# Patient Record
Sex: FEMALE | Race: WHITE | NOT HISPANIC OR LATINO | Employment: UNEMPLOYED | URBAN - METROPOLITAN AREA
[De-identification: names, ages, dates, MRNs, and addresses within clinical notes are randomized per-mention and may not be internally consistent; named-entity substitution may affect disease eponyms.]

---

## 2017-01-11 ENCOUNTER — OFFICE VISIT (OUTPATIENT)
Dept: OBSTETRICS AND GYNECOLOGY | Facility: CLINIC | Age: 27
End: 2017-01-11

## 2017-01-11 VITALS
DIASTOLIC BLOOD PRESSURE: 70 MMHG | WEIGHT: 168 LBS | SYSTOLIC BLOOD PRESSURE: 112 MMHG | BODY MASS INDEX: 27 KG/M2 | HEIGHT: 66 IN

## 2017-01-11 DIAGNOSIS — L73.9 FOLLICULITIS: ICD-10-CM

## 2017-01-11 DIAGNOSIS — R53.83 FATIGUE, UNSPECIFIED TYPE: ICD-10-CM

## 2017-01-11 DIAGNOSIS — N76.0 ACUTE VAGINITIS: Primary | ICD-10-CM

## 2017-01-11 PROCEDURE — 87210 SMEAR WET MOUNT SALINE/INK: CPT | Performed by: OBSTETRICS & GYNECOLOGY

## 2017-01-11 PROCEDURE — 99213 OFFICE O/P EST LOW 20 MIN: CPT | Performed by: OBSTETRICS & GYNECOLOGY

## 2017-01-11 PROCEDURE — 83986 ASSAY PH BODY FLUID NOS: CPT | Performed by: OBSTETRICS & GYNECOLOGY

## 2017-01-11 RX ORDER — METRONIDAZOLE 500 MG/1
500 TABLET ORAL 2 TIMES DAILY
Qty: 14 TABLET | Refills: 0 | Status: SHIPPED | OUTPATIENT
Start: 2017-01-11 | End: 2017-01-18

## 2017-01-11 NOTE — PROGRESS NOTES
"Subjective   Chief Complaint   Patient presents with   • Abdominal Pain     lower abd pain / vag irritation /    • Exposure to STD   • Nausea     Elodia Dunbar is a 27 y.o. year old  presenting to be seen because of vaginal irriation.   Current birth control method: Nexplanon contraceptive implant and condoms occasionally. One with condom another partner she has order so sick that she has herpes because some lesions on her mons.  She has been doing some online research.  She has become a little nauseated worrying about this.  She wants to have her HIV test [place] screened even nausea protected sex.  This was negative in 2016.  I suggested that if her a repeat this in May that that wouldn't really be cost effective to do today.  She is agreeable.  We will do the GC chlamydia today.  She is having some irritation as well so will probably do a wet mount    Past 6 month menstrual and contraceptive history:    No LMP recorded. Patient has had an implant.  Cycle Frequency: irregular   Menstrual cycle character: flow is light   Cycle Duration: 3 - 10   Number of heavy days of flows: 0   Intermenstrual bleeding present: no   Post-coital bleeding present: no     Some major pains - cramping and tightening    The following portions of the patient's history were reviewed and updated as appropriate:problem list, current medications and allergies    Review of Systems normal bladder and bowels nausea as above     Objective   Visit Vitals   • /70   • Ht 66\" (167.6 cm)   • Wt 168 lb (76.2 kg)   • LMP Comment: nexplanon   • BMI 27.12 kg/m2       General:  well developed; well nourished  no acute distress   Skin:  No suspicious lesions seen   Thyroid: not examined   Lungs:  breathing is unlabored   Heart:  Not performed.   Breasts:  Not performed.   Abdomen: soft, non-tender; no masses  no umbilical or inginual hernias are present  no hepato-splenomegaly   Pelvis: Clinical staff was present for exam  External " genitalia:  normal appearance of the external genitalia including Bartholin's and [place]'s glands. There are 2 lesions on her mons 1 approximately 1/2 cm on the left medially one more laterally that's about half a centimeter in size.  These are centered around hair follicles with the minimal amount of pus at the base of the hair follicle.  There is no blistering or anything consistent with a vesicular lesion  :  urethral meatus normal; urethral hypermobility is absent.  Vaginal:  normal pink mucosa without prolapse or lesions. discharge present -  Off-white somewhat thin slightly foamy pH is elevated at 6 microscopically a few clue cells are seen no trichomonads or saying;  Cervix:  normal appearance. GC chlamydia cultures obtained  Uterus:  normal size, shape and consistency.  Adnexa:  normal bimanual exam of the adnexa.  Rectal:  anus visually normal appearing.     Lab Review   GC chlamydia Pap test HIV RPR    Imaging   No data reviewed       Assessment   1. I think this represents folliculitis of the mons  2. Bacterial vaginosis  3. I do not think this represents herpes.  4. Nexplanon in place     Plan   1. Reassurance  2. HIV and RPR in May for completeness.  3. Continue use of condoms.    Medications Rx this encounter:  New Medications Ordered This Visit   Medications   • metroNIDAZOLE (FLAGYL) 500 MG tablet     Sig: Take 1 tablet by mouth 2 (Two) Times a Day for 7 days.     Dispense:  14 tablet     Refill:  0          This note was electronically signed.    Salvador Oshea MD  January 11, 2017

## 2017-01-11 NOTE — MR AVS SNAPSHOT
Elodia Dunbar   1/11/2017 11:00 AM   Office Visit    Dept Phone:  [phone]   Encounter #:  [id number]    Provider:  Salvador Oshea MD   Department:  [facility] [place]                Your Full Care Plan              Today's Medication Changes          These changes are accurate as of: 1/11/17 11:41 AM.  If you have any questions, ask your nurse or doctor.               New Medication(s)Ordered:     metroNIDAZOLE 500 MG tablet   Commonly known as:  FLAGYL   Take 1 tablet by mouth 2 (Two) Times a Day for 7 days.   Started by:  Salvador Oshea MD            Where to Get Your Medications      These medications were sent to [address] [facility] [phone] [facility] [phone]   [address], [facility] [address]     Phone:  [phone]     metroNIDAZOLE 500 MG tablet                  Your Updated Medication List          This list is accurate as of: 1/11/17 11:41 AM.  Always use your most recent med list.                AFLURIA PRESERVATIVE FREE 0.5 ML suspension prefilled syringe   Generic drug:  influenza virus vacc split PF       metroNIDAZOLE 500 MG tablet   Commonly known as:  FLAGYL   Take 1 tablet by mouth 2 (Two) Times a Day for 7 days.       NEXPLANON 68 MG implant subdermal implant   Generic drug:  Etonogestrel               You Were Diagnosed With        Codes Comments    Acute vaginitis    -  Primary ICD-10-CM: N76.0  ICD-9-CM: 616.10     Fatigue, unspecified type     ICD-10-CM: R53.83  ICD-9-CM: 780.79     Folliculitis     ICD-10-CM: L73.9  ICD-9-CM: 704.8       Medications to be Given to You by a Medical Professional     Due       Frequency    (none) ibuprofen (ADVIL,MOTRIN) tablet 600 mg  Every 6 Hours PRN      Instructions     None    Patient Instructions History      Upcoming Appointments     Visit Type Date Time Department    GYN FOLLOW UP 1/11/2017 11:00 AM [facility]  "[facility]      [employer] Signup     [facility] [employer] allows you to send messages to your doctor, view your test results, renew your prescriptions, schedule appointments, and more. To sign up, go to [employer] and click on the Sign Up Now link in the New User? box. Enter your [employer] Activation Code exactly as it appears below along with the last four digits of your Social Security Number and your Date of Birth () to complete the sign-up process. If you do not sign up before the expiration date, you must request a new code.    [employer] Activation Code: [email or web address]  Expires: 2017 11:41 AM    If you have questions, you can email [email or web address] or call [phone] to talk to our [employer] staff. Remember, [employer] is NOT to be used for urgent needs. For medical emergencies, dial 911.               Other Info from Your Visit           Allergies     No Known Allergies      Reason for Visit     Abdominal Pain lower abd pain / vag irritation /     Exposure to STD     Nausea           Vital Signs     Blood Pressure Height Weight Body Mass Index Smoking Status       112/70 66\" (167.6 cm) 168 lb (76.2 kg) 27.12 kg/m2 Never Smoker       Problems and Diagnoses Noted     Inflammation of vagina    -  Primary    Tiredness        Folliculitis            "

## 2017-01-23 ENCOUNTER — TELEPHONE (OUTPATIENT)
Dept: OBSTETRICS AND GYNECOLOGY | Facility: CLINIC | Age: 27
End: 2017-01-23

## 2017-01-23 NOTE — TELEPHONE ENCOUNTER
----- Message from Maame Platt sent at 1/23/2017  2:16 PM EST -----  Please call pt at [phone]      Dr. Oshea pt  1/23/2017 @ 3:44pm [phone] patient states folliculitis of the mons is getting worse. She has been applying heat but it is not helping, she has noticed a couple more spots.

## 2017-01-27 ENCOUNTER — OFFICE VISIT (OUTPATIENT)
Dept: OBSTETRICS AND GYNECOLOGY | Facility: CLINIC | Age: 27
End: 2017-01-27

## 2017-01-27 VITALS
BODY MASS INDEX: 28.12 KG/M2 | HEIGHT: 66 IN | DIASTOLIC BLOOD PRESSURE: 80 MMHG | SYSTOLIC BLOOD PRESSURE: 116 MMHG | WEIGHT: 175 LBS

## 2017-01-27 DIAGNOSIS — B37.31 MONILIAL VULVITIS: ICD-10-CM

## 2017-01-27 DIAGNOSIS — N76.0 ACUTE VAGINITIS: Primary | ICD-10-CM

## 2017-01-27 PROCEDURE — 87210 SMEAR WET MOUNT SALINE/INK: CPT | Performed by: OBSTETRICS & GYNECOLOGY

## 2017-01-27 PROCEDURE — 83986 ASSAY PH BODY FLUID NOS: CPT | Performed by: OBSTETRICS & GYNECOLOGY

## 2017-01-27 PROCEDURE — 99212 OFFICE O/P EST SF 10 MIN: CPT | Performed by: OBSTETRICS & GYNECOLOGY

## 2017-01-27 RX ORDER — FLUCONAZOLE 200 MG/1
200 TABLET ORAL DAILY
Qty: 3 TABLET | Refills: 1 | Status: SHIPPED | OUTPATIENT
Start: 2017-01-27 | End: 2017-08-14

## 2017-01-27 RX ORDER — METRONIDAZOLE 500 MG/1
500 TABLET ORAL 2 TIMES DAILY
Qty: 14 TABLET | Refills: 0 | Status: SHIPPED | OUTPATIENT
Start: 2017-01-27 | End: 2017-02-03

## 2017-01-27 NOTE — MR AVS SNAPSHOT
Elodia Dunbar   1/27/2017 2:30 PM   Office Visit    Dept Phone:  [phone]   Encounter #:  [id number]    Provider:  Salvador Oshea MD   Department:  [facility] [place]                Your Full Care Plan              Today's Medication Changes          These changes are accurate as of: 1/27/17  3:29 PM.  If you have any questions, ask your nurse or doctor.               New Medication(s)Ordered:     fluconazole 200 MG tablet   Commonly known as:  DIFLUCAN   Take 1 tablet by mouth Daily. Take 1 pill every other day #3   Started by:  Salvador Oshea MD       metroNIDAZOLE 500 MG tablet   Commonly known as:  FLAGYL   Take 1 tablet by mouth 2 (Two) Times a Day for 7 days.   Started by:  Salvador Oshea MD            Where to Get Your Medications      These medications were sent to [place] [address] [address] [facility] [phone] [facility] [phone] FX  [address], [place] [address]     Phone:  [phone]     fluconazole 200 MG tablet    metroNIDAZOLE 500 MG tablet                  Your Updated Medication List          This list is accurate as of: 1/27/17  3:29 PM.  Always use your most recent med list.                AFLURIA PRESERVATIVE FREE 0.5 ML suspension prefilled syringe   Generic drug:  influenza virus vacc split PF       fluconazole 200 MG tablet   Commonly known as:  DIFLUCAN   Take 1 tablet by mouth Daily. Take 1 pill every other day #3       metroNIDAZOLE 500 MG tablet   Commonly known as:  FLAGYL   Take 1 tablet by mouth 2 (Two) Times a Day for 7 days.       NEXPLANON 68 MG implant subdermal implant   Generic drug:  Etonogestrel               You Were Diagnosed With        Codes Comments    Acute vaginitis    -  Primary ICD-10-CM: N76.0  ICD-9-CM: 616.10     Monilial vulvitis     ICD-10-CM: B37.3  ICD-9-CM: 112.1       Medications to be Given to You by a Medical Professional       Instructions     "None    Patient Instructions History      Upcoming Appointments     Visit Type Date Time Department    GYN FOLLOW UP 2017  2:30 PM [facility]    GYN FOLLOW UP 2017 10:00 AM [facility]      MyChart Signup     [facility] [employer] allows you to send messages to your doctor, view your test results, renew your prescriptions, schedule appointments, and more. To sign up, go to [employer] and click on the Sign Up Now link in the New User? box. Enter your [employer] Activation Code exactly as it appears below along with the last four digits of your Social Security Number and your Date of Birth () to complete the sign-up process. If you do not sign up before the expiration date, you must request a new code.    [employer] Activation Code: [email or web address]  Expires: 2/10/2017  3:29 PM    If you have questions, you can email [email or web address] or call [phone] to talk to our [employer] staff. Remember, [employer] is NOT to be used for urgent needs. For medical emergencies, dial 911.               Other Info from Your Visit           Your Appointments     2017 10:00 AM EDT   GYN FOLLOW UP with Salvador Oshea MD   [facility] [place] (--)    [address] [place] [address] [address] [place] [id number] [phone]              Allergies     No Known Allergies      Reason for Visit     Follow-up hair follicle no better      Vital Signs     Blood Pressure Height Weight Body Mass Index Smoking Status       116/80 66\" (167.6 cm) 175 lb (79.4 kg) 28.25 kg/m2 Never Smoker       Problems and Diagnoses Noted     Inflammation of vagina    -  Primary    Yeast infection of genitourinary system            "

## 2017-01-27 NOTE — PROGRESS NOTES
"Subjective   Chief Complaint   Patient presents with   • Follow-up     hair follicle no better     Elodia Dunbar is a 27 y.o. year old  presenting to be seen for evaluation of an abnormal vaginal discharge. He is concerned that this may be herpes as she had an outbreak on the mons and cleared up she had intercourse in had a recurrence.  She also has a vaginal discharge she thinks bacterial vaginosis.  She is sexually active.  In the past 12 months there has been new sexual partners.  Condoms are not typically used.  She would like to be screened for herpes however there are no vesicular lesions to screen or test 4.originally this looked like folliculitis and was treated as such but it is gotten worse.      Current biPast 6 month menstrual and contraceptive history:    No LMP recorded. Patient has had an implant.                              The following portions of the patient's history were reviewed and updated as appropriate:current medications, allergies and past surgical history     Review of Systems normal bladder and bowels no pain during intercourse     Objective   Visit Vitals   • /80   • Ht 66\" (167.6 cm)   • Wt 175 lb (79.4 kg)   • BMI 28.25 kg/m2       General:  well developed; well nourished  no acute distress   Skin:  No suspicious lesions seen  Rash noted on on the left area of the mons there are numerous red scaling lesions with dry skin no vesicular lesions whatsoever.  There is central clearing.  There is one area that's a little firmer where the original lesion was a few weeks ago.   Abdomen: soft, non-tender; no masses  no umbilical or inginual hernias are present  no hepato-splenomegaly   Pelvis: Clinical staff was present for exam  External genitalia:  normal appearance of the external genitalia including Bartholin's and [place]'s glands. see above description  :  urethral meatus normal; urethral hypermobility is absent.  Vaginal:  normal pink mucosa without prolapse or lesions. " discharge present -  yellow, white, thick and the pH is elevated and there are clue cells seen microscopically;  Cervix:  normal appearance.  Rectal:  anus visually normal appearing.     Physical Exam    Lab Review   No data reviewed    Imaging   No data reviewed       Assessment   1. This appears to be a tenia/yeast on the mons area  2. Bacterial vaginosis     Plan   1. Diflucan 200 mg 1 by mouth every other day numbers 6 with1 refill- she could use over-the-counter Tinactin-type spray as well  2. Flagyl 500 mg twice a day #14    Medications Rx this encounter:  No orders of the defined types were placed in this encounter.         This note was electronically signed.    Salvador Oshea MD  January 27, 2017

## 2017-02-15 ENCOUNTER — TELEPHONE (OUTPATIENT)
Dept: OBSTETRICS AND GYNECOLOGY | Facility: CLINIC | Age: 27
End: 2017-02-15

## 2017-02-15 PROCEDURE — G0432 EIA HIV-1/HIV-2 SCREEN: HCPCS | Performed by: NURSE PRACTITIONER

## 2017-02-15 PROCEDURE — 86780 TREPONEMA PALLIDUM: CPT | Performed by: NURSE PRACTITIONER

## 2017-02-15 NOTE — TELEPHONE ENCOUNTER
Pt had tried benedryl. Still has rash. Had been off antibiotic for several days. Because of full schedule was to go to [facility]

## 2017-02-15 NOTE — TELEPHONE ENCOUNTER
----- Message from Cindy Le sent at 2/15/2017  3:36 PM EST -----  Contact: [phone]  Pt pofc adv has rash( not pregnant) - was on antibiotics previously   Rash is on stomach - thighs and behind her knees and was wondering if this was a reaction to the antibiotics

## 2017-02-20 ENCOUNTER — TELEPHONE (OUTPATIENT)
Dept: URGENT CARE | Facility: CLINIC | Age: 27
End: 2017-02-20

## 2017-08-14 ENCOUNTER — OFFICE VISIT (OUTPATIENT)
Dept: OBSTETRICS AND GYNECOLOGY | Facility: CLINIC | Age: 27
End: 2017-08-14

## 2017-08-14 ENCOUNTER — APPOINTMENT (OUTPATIENT)
Dept: LAB | Facility: HOSPITAL | Age: 27
End: 2017-08-14

## 2017-08-14 VITALS
WEIGHT: 183 LBS | BODY MASS INDEX: 30.49 KG/M2 | HEIGHT: 65 IN | DIASTOLIC BLOOD PRESSURE: 70 MMHG | SYSTOLIC BLOOD PRESSURE: 114 MMHG

## 2017-08-14 DIAGNOSIS — Z97.5 NEXPLANON IN PLACE: ICD-10-CM

## 2017-08-14 DIAGNOSIS — Z71.1 CONCERN ABOUT STD IN FEMALE WITHOUT DIAGNOSIS: ICD-10-CM

## 2017-08-14 DIAGNOSIS — Z01.419 WOMEN'S ANNUAL ROUTINE GYNECOLOGICAL EXAMINATION: Primary | ICD-10-CM

## 2017-08-14 DIAGNOSIS — N92.6 IRREGULAR MENSES: ICD-10-CM

## 2017-08-14 DIAGNOSIS — Z30.46 ENCOUNTER FOR SURVEILLANCE OF IMPLANTABLE SUBDERMAL CONTRACEPTIVE: ICD-10-CM

## 2017-08-14 PROBLEM — B00.9 HSV-1 INFECTION: Status: ACTIVE | Noted: 2017-08-14

## 2017-08-14 LAB — HIV1+2 AB SER QL: NORMAL

## 2017-08-14 PROCEDURE — 99395 PREV VISIT EST AGE 18-39: CPT | Performed by: OBSTETRICS & GYNECOLOGY

## 2017-08-14 PROCEDURE — 86592 SYPHILIS TEST NON-TREP QUAL: CPT | Performed by: OBSTETRICS & GYNECOLOGY

## 2017-08-14 PROCEDURE — 36415 COLL VENOUS BLD VENIPUNCTURE: CPT | Performed by: OBSTETRICS & GYNECOLOGY

## 2017-08-14 PROCEDURE — G0432 EIA HIV-1/HIV-2 SCREEN: HCPCS | Performed by: OBSTETRICS & GYNECOLOGY

## 2017-08-14 RX ORDER — LINDANE 10 MG/ML
SHAMPOO, SUSPENSION TOPICAL ONCE
Qty: 60 ML | Refills: 0 | Status: SHIPPED | OUTPATIENT
Start: 2017-08-14 | End: 2017-08-14

## 2017-08-14 NOTE — PROGRESS NOTES
"Subjective   Chief Complaint   Patient presents with   • Annual Exam     Elodia Dunbar is a 27 y.o. year old  presenting to be seen for her annual exam.    Current birth control method: Nexplanon contraceptive implant.    No LMP recorded. Patient has had an implant.    She is sexually active.   Condoms are not typically used.      Past 6 month menstrual history:    Cycle Frequency: irregular   Menstrual cycle character: flow is typically light and flow is typically normal   Cycle Duration: 2 - 3   Number of heavy days of flows: 0   Intermenstrual bleeding present: {yes   Post-coital bleeding present: no     She exercises regularly: no.  Calcium intake: yes.  She performs self breast exam:no.  She has concerns about domestic violence: no.    The following portions of the patient's history were reviewed and updated as appropriate:problem list, current medications, allergies, past family history, past medical history, past social history and past surgical history.    Review of Systems    normal bladder and bowels  Objective   /70  Ht 64.75\" (164.5 cm)  Wt 183 lb (83 kg)  LMP Comment: nexplanon  BMI 30.69 kg/m2     General:  well developed; well nourished  no acute distress  appears stated age   Skin:  Her lower abdomen has small pink bumps.  The lower extremities have some pink slightly raised plaques and red areas particularly around her ankles that are healing scars.   Thyroid: normal to inspection and palpation   Breasts:  Examined in supine position  Symmetric without masses or skin dimpling  Nipples normal without inversion, lesions or discharge  There are no palpable axillary nodes   Abdomen: soft, non-tender; no masses  no umbilical or inginual hernias are present  no hepato-splenomegaly   Pelvis: Clinical staff was present for exam  External genitalia:  normal appearance of the external genitalia including Bartholin's and [place]'s glands.  :  urethral meatus normal; urethral hypermobility is " absent.  Vaginal:  normal pink mucosa without prolapse or lesions.  Cervix:  normal appearance. Screen for GC/Chlamydia/trichomoniasis  Uterus:  normal size, shape and consistency.  Adnexa:  normal bimanual exam of the adnexa.  Rectal:  digital rectal exam not performed; anus visually normal appearing.     Lab Review   No data reviewed    Imaging  No data reviewed       Assessment   1. Yearly examination  2. STD exposure.  She notes a rash after she saw L[place] with a new partner last November 2016.  She's been the health department for months after that with negative HIV and RPR.  They did test her positive for HSV.  She has a previous history of HPV 2010.  She reports a painless ulcer after that exposure wonders if that was syphilis.  3. Recent exposure to scabies 1 month ago.  The sister of a friend she stayed at developed scabies after staying at her sister's home.  These lesions could be scabies and we'll treat as such.  4. Irregular menses due to Nexplanon there were 2016  5. She found out today that her boyfriend has been cheating on her.  They have last had sex about 1 month ago.  We will do blood work and GC chlamydia etc.     Plan   1. As above  2. Condoms for any new relationships.    Medications Rx this encounter:  New Medications Ordered This Visit   Medications   • lindane 1 % shampoo     Sig: Apply  topically 1 (One) Time for 1 dose.     Dispense:  60 mL     Refill:  0          This note was electronically signed.    Salvador Oshea MD  8/14/2017

## 2017-08-15 ENCOUNTER — RESULTS ENCOUNTER (OUTPATIENT)
Dept: OBSTETRICS AND GYNECOLOGY | Facility: CLINIC | Age: 27
End: 2017-08-15

## 2017-08-15 DIAGNOSIS — Z01.419 WOMEN'S ANNUAL ROUTINE GYNECOLOGICAL EXAMINATION: ICD-10-CM

## 2017-08-16 LAB — RPR SER QL: NORMAL

## 2018-11-08 PROBLEM — Z20.2 STD EXPOSURE: Status: ACTIVE | Noted: 2018-11-08

## 2019-01-02 PROCEDURE — 87491 CHLMYD TRACH DNA AMP PROBE: CPT | Performed by: NURSE PRACTITIONER

## 2019-01-02 PROCEDURE — 87591 N.GONORRHOEAE DNA AMP PROB: CPT | Performed by: NURSE PRACTITIONER

## 2019-01-02 PROCEDURE — 87661 TRICHOMONAS VAGINALIS AMPLIF: CPT | Performed by: NURSE PRACTITIONER

## 2019-01-07 ENCOUNTER — TELEPHONE (OUTPATIENT)
Dept: URGENT CARE | Facility: CLINIC | Age: 29
End: 2019-01-07

## 2019-01-07 NOTE — TELEPHONE ENCOUNTER
Notified Elodia of STD results. She states symptoms have resolved with Diflucan, advised if symptoms persist follow up.

## 2019-01-22 ENCOUNTER — OFFICE VISIT (OUTPATIENT)
Dept: OBSTETRICS AND GYNECOLOGY | Facility: CLINIC | Age: 29
End: 2019-01-22

## 2019-01-22 VITALS
BODY MASS INDEX: 28.49 KG/M2 | DIASTOLIC BLOOD PRESSURE: 70 MMHG | WEIGHT: 171 LBS | SYSTOLIC BLOOD PRESSURE: 114 MMHG | HEIGHT: 65 IN

## 2019-01-22 DIAGNOSIS — N92.6 IRREGULAR MENSES: ICD-10-CM

## 2019-01-22 DIAGNOSIS — Z97.5 NEXPLANON IN PLACE: ICD-10-CM

## 2019-01-22 DIAGNOSIS — Z20.2 STD EXPOSURE: ICD-10-CM

## 2019-01-22 DIAGNOSIS — Z01.419 ENCOUNTER FOR WELL WOMAN EXAM WITH ROUTINE GYNECOLOGICAL EXAM: Primary | ICD-10-CM

## 2019-01-22 PROCEDURE — 99395 PREV VISIT EST AGE 18-39: CPT | Performed by: OBSTETRICS & GYNECOLOGY

## 2019-01-22 NOTE — PROGRESS NOTES
Subjective   Chief Complaint   Patient presents with   • Annual Exam     partner had syphyllis, patient tested negative, received 1 pen injection     Elodia Dunbar is a 29 y.o. year old  presenting to be seen for her annual exam.    Current birth control method: Nexplanon contraceptive implantNexplanon contraceptive implant.  She has been seen recently at [facility] diagnosed with syphilis.  Her boyfriend had an open sore.  She is checked twice and has been negative.  Discussed need for other STD screening.  She reports that this was done last year at urgent treatment center negative GC etc. not sure about HIV testing.  I do not see that in the reviewed records from [facility].  We will try to get a release of information signed today.  I will order Bicillin 2,400,000 units to see if she get her second shot tomorrow.  Her Nexplanon will be expiring soon even though she is having light flow will need to remove and replace at the same time.    No LMP recorded. Patient has had an implant.    She is sexually active.   Condoms ARE typically used.    Ronald is painful or she is having problemsno  She has concerns about domestic violence: no.    Cycle Frequency: regular, predictable and consistent every 28 - 32 days   Menstrual cycle character: flow is typically light and flow is typically normal   Cycle Duration: 2 - 3   Number of heavy days of flows: 0   Intermenstrual bleeding present: no   Post-coital bleeding present: no     She exercises regularly: no.  Calcium intake: is adequate.  Caffeine intake:moderate  She performs self breast exam:yes.  Smoker : non-smoker  Alcohol :regular (heavy) 14+ per week.     The following portions of the patient's history were reviewed and updated asis as will will will will will appropriate:problem list, current medications, allergies, past family history, past medical history, past social history and past surgical history.    Review of Systems    normal bladder  "and bowels  Objective   /70   Ht 165.1 cm (65\")   Wt 77.6 kg (171 lb)   Breastfeeding? No   BMI 28.46 kg/m²       General:  well developed; well nourished  no acute distress  appears stated age   Skin:  No suspicious lesions seen   Thyroid:    Breasts:  Examined in supine position  Symmetric without masses or skin dimpling  Nipples normal without inversion, lesions or discharge  There are no palpable axillary nodes   Abdomen: soft, non-tender; no masses  no umbilical or inguinal hernias are present  no hepato-splenomegaly   Pelvis: Clinical staff was present for exam  External genitalia:  normal appearance of the external genitalia including Bartholin's and [place]'s glands.  :  urethral meatus normal;  Vaginal:  normal pink mucosa without prolapse or lesions.  Cervix:  normal appearance. Pap test obtained  Uterus:  normal size, shape and consistency.  Adnexa:  normal bimanual exam of the adnexa.  Rectal:  digital rectal exam not performed; anus visually normal appearing.     Lab Review   STD swabs for GC, chlamydia and trichimoniasis and Pap test    Imaging  Pelvic ultrasound report       Assessment   1. Normal GYN examination with STD syphilis exposure.  Boyfriend has tested positive she is tested negative twice.  She received a shot at [facility] suggested 3 shot regimen.  Need to find out whether or not she has had HIV screening done.  We will have done GC chlamydia testing today.  2. Nexplanon will be expiring will need to schedule replacement.     Plan   1. 1000 mg calcium in divided doses ideally in diet; regular exercise  2. Condoms for STD protection and also given questionable history of BV in the past.  3. She will call Maame tomorrow to see if we have the Bicillin shot for her to receive tomorrow.  To light today to get it done.  4. Self breast awareness and mammogram protocols discussed.  5. Annual examination or sooner as needed      Medications Rx this encounter:  No orders of the " defined types were placed in this encounter.         This note was electronically signed.    Salvador Oshea MD  1/22/2019

## 2019-02-14 ENCOUNTER — OFFICE VISIT (OUTPATIENT)
Dept: OBSTETRICS AND GYNECOLOGY | Facility: CLINIC | Age: 29
End: 2019-02-14

## 2019-02-14 VITALS
WEIGHT: 174 LBS | RESPIRATION RATE: 16 BRPM | BODY MASS INDEX: 28.96 KG/M2 | SYSTOLIC BLOOD PRESSURE: 116 MMHG | DIASTOLIC BLOOD PRESSURE: 70 MMHG

## 2019-02-14 DIAGNOSIS — Z30.9 ENCOUNTER FOR CONTRACEPTIVE MANAGEMENT, UNSPECIFIED TYPE: Primary | ICD-10-CM

## 2019-02-14 PROCEDURE — 11983 REMOVE/INSERT DRUG IMPLANT: CPT | Performed by: OBSTETRICS & GYNECOLOGY

## 2019-02-14 NOTE — PROGRESS NOTES
"Nexplanon Removal and Reinsertion    No LMP recorded. Patient has had an implant.    Date of procedure:  2/14/2019    Risks and benefits discussed? yes  All questions answered? yes  Consents given by the patient  Written consent obtained? yes    Local anesthesia used:  3 ml of 1% lidocaine injected    Procedure documentation:    The upper left arm was marked at the intended site of removal.  Betadine was used to cleanse the skin.1% lidocaine was injected.  An incision was made near  the distal tip of the implant.  The implant was removed intact without difficulty.  The patient was shown the device prior to disposal of the Nexplanon.    The new Nexplanon was placed subdermally in standard fashion through the same incision; it was placed at a ~ 15* different angle with the proximal and heading away from the median nerve and basilic vein.  The device was  palpated by both myself and Elodia.  Steri-strips and a band-aid were placed across the site of insertion.    She tolerated the procedure well.  There were no complications.  EBL was minimal.    Post procedure instructions: Remove the band-aid in 24 hours and the steri-strips in 5 days .Keep the arm \"high and dry\" for 24-36 hours. Use ice and NSAID's as needed.    Follow up needed: PRN    This note was electronically signed.    Salvador Oshea M.D.  February 14, 2019        "

## 2019-10-09 PROCEDURE — 87661 TRICHOMONAS VAGINALIS AMPLIF: CPT | Performed by: NURSE PRACTITIONER

## 2019-10-09 PROCEDURE — 87491 CHLMYD TRACH DNA AMP PROBE: CPT | Performed by: NURSE PRACTITIONER

## 2019-10-09 PROCEDURE — 87591 N.GONORRHOEAE DNA AMP PROB: CPT | Performed by: NURSE PRACTITIONER

## 2019-10-13 ENCOUNTER — TELEPHONE (OUTPATIENT)
Dept: URGENT CARE | Facility: CLINIC | Age: 29
End: 2019-10-13

## 2019-11-25 ENCOUNTER — OFFICE VISIT (OUTPATIENT)
Dept: OBSTETRICS AND GYNECOLOGY | Facility: CLINIC | Age: 29
End: 2019-11-25

## 2019-11-25 VITALS
BODY MASS INDEX: 28.79 KG/M2 | WEIGHT: 173 LBS | RESPIRATION RATE: 16 BRPM | DIASTOLIC BLOOD PRESSURE: 70 MMHG | SYSTOLIC BLOOD PRESSURE: 118 MMHG

## 2019-11-25 DIAGNOSIS — Z20.2 EXPOSURE TO STD: ICD-10-CM

## 2019-11-25 DIAGNOSIS — Z01.411 ENCOUNTER FOR GYNECOLOGICAL EXAMINATION WITH ABNORMAL FINDING: Primary | ICD-10-CM

## 2019-11-25 DIAGNOSIS — Z97.5 NEXPLANON IN PLACE: ICD-10-CM

## 2019-11-25 DIAGNOSIS — R10.32 LLQ ABDOMINAL PAIN: ICD-10-CM

## 2019-11-25 PROBLEM — A59.01 TRICHOMONAL VAGINITIS: Status: ACTIVE | Noted: 2019-11-25

## 2019-11-25 PROCEDURE — 87210 SMEAR WET MOUNT SALINE/INK: CPT | Performed by: OBSTETRICS & GYNECOLOGY

## 2019-11-25 PROCEDURE — 83986 ASSAY PH BODY FLUID NOS: CPT | Performed by: OBSTETRICS & GYNECOLOGY

## 2019-11-25 PROCEDURE — 99213 OFFICE O/P EST LOW 20 MIN: CPT | Performed by: OBSTETRICS & GYNECOLOGY

## 2019-11-25 RX ORDER — VALACYCLOVIR HYDROCHLORIDE 1 G/1
1000 TABLET, FILM COATED ORAL 2 TIMES DAILY
Refills: 0 | COMMUNITY
Start: 2019-10-14 | End: 2020-01-23

## 2019-11-25 NOTE — PROGRESS NOTES
"Subjective   Chief Complaint   Patient presents with   • Abdominal Pain     LLQ pain / dx with HSV 2 / std screening for hiv and RPR / leuk panel done 10/9/19     Elodia Dunbar is a 29 y.o. year old  presenting to be seen because of STD exposure Oct 6th - no lesion 2 small + HSV no condoms!    Current birth control method: .Nexplanon  Pains on left since then and feels \"off\" she is concerned it could be HIV  Tested negative in OCT at [facility] also + trich treated   LLQ pain for 1 month and diarrhea    Worried about facial and skin lesions on hand reticular lesion - [employer] looks like her hand ?  3 weeks of thick white d/c no odor no itch   No pain with IC  She admits that she may be able but paranoid about the STD exposure that she is had as she has been diagnosed with HSV HPV trichomoniasis.      No LMP recorded. Patient has had an implant.  Cycle Frequency: unpredictable  irregular  infrequent   Menstrual cycle character: flow has been absent and flow is typically light   Cycle Duration: 1 - 2   Number of heavy days of flows: 0   Intermenstrual bleeding present: yes   Post-coital bleeding present: no       The following portions of the patient's history were reviewed and updated as appropriate:She  has a past medical history of History of frequent urinary tract infections, HPV in female, and HSV-2 (herpes simplex virus 2) infection.  She does not have any pertinent problems on file.  She  has no past surgical history on file.  Her family history includes Breast cancer in her paternal aunt; Kidney disease in an other family member; Leukemia in an other family member; Other in an other family member.  She  reports that she has never smoked. She has never used smokeless tobacco. She reports that she drinks alcohol. She reports that she does not use drugs.  She is allergic to flagyl [metronidazole].    Current Outpatient Medications:   •  Etonogestrel (NEXPLANON SC), Inject  under the skin into the appropriate area " as directed., Disp: , Rfl:   •  valACYclovir (VALTREX) 1000 MG tablet, Take 1,000 mg by mouth 2 (Two) Times a Day. for 7 days, Disp: , Rfl: 0  Review of Systems normal bladder and diarrhea for the past month.     Objective   /70   Resp 16   Wt 78.5 kg (173 lb)   Breastfeeding? No   BMI 28.79 kg/m²     General:  well developed; well nourished  no acute distress  appears stated age   Skin:  No suspicious lesions seen   Thyroid: not examined   Lungs:  breathing is unlabored   Heart:  Not performed.       Abdomen: no umbilical or inguinal hernias are present  no hepato-splenomegaly  Soft slightly tender left no CVAT   Pelvis: Clinical staff was present for exam  External genitalia:  normal appearance of the external genitalia including Bartholin's and [place]'s glands.  :  urethral meatus normal;  Vaginal:  normal pink mucosa without prolapse or lesions. discharge present -  white and thick; pH = 4.5 wet prep done: normal epithelial cells are present, clue cells are absent, pseudo-hyphae are absent and trichomonads are absent;  Cervix:  normal appearance.  Uterus:  normal size, shape and consistency. anteverted;  Adnexa:  Left adnexal fullness and guarding inadequate exam no definite mass felt     Lab Review   STD swabs for GC, chlamydia and trichimoniasis    Imaging   No data reviewed       Assessment   1. STD exposure most recently trichomoniasis treatment has been effective.  Stressed again condom use.  2. Lesion culture positive HSV on valacyclovir I would suggest continuation  3. Left lower quadrant pain fullness no definite mass palpable we will get ultrasound to rule out cyst.  4. Patient is concerned regarding HIV.  Discussed that if she has had a recent normal test at the [facility] it would not make sense to check another one this month I would do another 1 in April which would be 6 months from the first negative screen.     Plan   1. Reassurance and encouraged to use condoms with all sexual  encounters to limit partners and  Exposures  2. HIV test in April.      Orders Placed This Encounter   Procedures   • US Non-ob Transvaginal     Standing Status:   Future     Standing Expiration Date:   11/25/2020     Order Specific Question:   Reason for Exam:     Answer:   GYN - LLQ pelvic pain   • HIV-1 / O / 2 Ag / Antibody 4th Generation     Standing Status:   Future     Standing Expiration Date:   11/25/2020   • RPR     Standing Status:   Future     Standing Expiration Date:   11/25/2020     No orders of the defined types were placed in this encounter.           This note was electronically signed.    Salvador Oshea MD  November 25, 2019

## 2020-01-23 ENCOUNTER — RESULTS ENCOUNTER (OUTPATIENT)
Dept: OBSTETRICS AND GYNECOLOGY | Facility: CLINIC | Age: 30
End: 2020-01-23

## 2020-01-23 ENCOUNTER — OFFICE VISIT (OUTPATIENT)
Dept: OBSTETRICS AND GYNECOLOGY | Facility: CLINIC | Age: 30
End: 2020-01-23

## 2020-01-23 VITALS
WEIGHT: 169 LBS | DIASTOLIC BLOOD PRESSURE: 70 MMHG | SYSTOLIC BLOOD PRESSURE: 116 MMHG | HEIGHT: 65 IN | BODY MASS INDEX: 28.16 KG/M2

## 2020-01-23 DIAGNOSIS — N76.0 ACUTE VAGINITIS: ICD-10-CM

## 2020-01-23 DIAGNOSIS — Z97.5 NEXPLANON IN PLACE: ICD-10-CM

## 2020-01-23 DIAGNOSIS — Z01.411 ENCOUNTER FOR GYNECOLOGICAL EXAMINATION WITH ABNORMAL FINDING: ICD-10-CM

## 2020-01-23 DIAGNOSIS — Z01.411 ENCOUNTER FOR GYNECOLOGICAL EXAMINATION WITH ABNORMAL FINDING: Primary | ICD-10-CM

## 2020-01-23 PROCEDURE — 99395 PREV VISIT EST AGE 18-39: CPT | Performed by: OBSTETRICS & GYNECOLOGY

## 2020-01-23 RX ORDER — VALACYCLOVIR HYDROCHLORIDE 500 MG/1
500 TABLET, FILM COATED ORAL DAILY
Qty: 30 TABLET | Refills: 5 | Status: SHIPPED | OUTPATIENT
Start: 2020-01-23 | End: 2022-02-18 | Stop reason: SDUPTHER

## 2020-01-23 RX ORDER — VALACYCLOVIR HYDROCHLORIDE 500 MG/1
TABLET, FILM COATED ORAL
COMMUNITY
Start: 2019-10-23 | End: 2020-01-23 | Stop reason: SDUPTHER

## 2020-01-23 NOTE — PROGRESS NOTES
Subjective   Chief Complaint   Patient presents with   • Annual Exam     std screening done 1 month ago at primary MD     Elodia Dunbar is a 30 y.o. year old  presenting to be seen for her annual exam.    Current birth control method: Nexplanon.  She has some burning sensation with intercourse maybe with urination for the last month or so.  Took some Azo did not help at all.  Not much discharge.  Does have a history of prior STDs.  Will go ahead and do some screening today.  Periods are irregular with a Nexplanon in place.  This is not a problem for her partner is not wearing a condom which we would indicate or suggest to prevent STDs.  Primary care provider did some STD screening about a month ago that was negative.  Is Dr. Cleve yeh on my location but I cannot find anybody with that last name in epic.    No LMP recorded. Patient has had an implant.    She is sexually active.   Condoms are not typically used.    [place] is painful or she is having problems :no  She has concerns about domestic violence: no.    Cycle Frequency: unpredictable  irregular   Menstrual cycle character: flow is typically light   Cycle Duration: 2 - 3   Number of heavy days of flows: 0   Intermenstrual bleeding present: yes   Post-coital bleeding present: no     She exercises regularly: no.  Self breast awareness:no    Calcium intake: is not adequate.2  Caffeine intake: no or mild caffeine use  Social History    Tobacco Use      Smoking status: Never Smoker      Smokeless tobacco: Never Used    Social History     Substance and Sexual Activity   Alcohol Use Not Currently    Comment: beer/ quit in 2019        The following portions of the patient's history were reviewed and updated as is  appropriate:problem list, current medications, allergies, past family history, past medical history, past social history and past surgical history.    Current Outpatient Medications:   •  Etonogestrel (NEXPLANON SC), Inject  under  "the skin into the appropriate area as directed., Disp: , Rfl:   •  valACYclovir (VALTREX) 500 MG tablet, Take 1 tablet by mouth Daily., Disp: 30 tablet, Rfl: 5    Review of systems  Constitutional    POS nothing reported                            NEG anorexia, malaise or night sweats  Breast                POS nothing reported                            NEG persistent breast lump, skin dimpling, breast tenderness or nipple discharge  GI                      POS nothing reported                            NEG bloating, change in bowel habits, melena or reflux symptoms                       POS nothing reported                            NEG dysuria or hematuria       Objective   /70   Ht 165.1 cm (65\")   Wt 76.7 kg (169 lb)   Breastfeeding No   BMI 28.12 kg/m²       General:  well developed; well nourished  no acute distress  appears stated age   Skin:  No suspicious lesions seen   Thyroid:    Breasts:  Examined in supine position  Symmetric without masses or skin dimpling  Nipples normal without inversion, lesions or discharge  Fibrocystic changes are present both breasts without a discrete mass   Abdomen: soft, non-tender; no masses  no umbilical or inguinal hernias are present  no hepato-splenomegaly   Pelvis: Clinical staff was present for exam  External genitalia:  normal appearance of the external genitalia including Bartholin's and [place]'s glands.  :  urethral meatus normal;  Vaginal:  normal pink mucosa without prolapse or lesions. discharge present -  white and thick; pH = 4.5 wet prep done: normal epithelial cells are present, clue cells are absent and pseudo-hyphae are absent; Minimal amount of discharge no odor  Cervix:  normal appearance. STD screening obtained  Uterus:  normal size, shape and consistency. Her bladder is nontender to single or bimanual exam  Adnexa:  normal bimanual exam of the adnexa.  Rectal:  digital rectal exam not performed; anus visually normal appearing.       Lab " Review   Pap test and STD swabs for GC, chlamydia and trichimoniasis    Imaging  Pelvic ultrasound report       Assessment     1. Annual examination was some vaginal burning with intercourse no evidence of any infection today pH is normal; therefore antibiotics and even estrogen probably not indicated she could try some [place] silk or samples during intercourse and I will give her some of those.  2. Irregular menses with Nexplanon in place  3. Bladder nontender on examination no frequency etc. so doubtful bladder related  4. History of herpes but this symptoms without last a month.  She would like to remain on suppressive therapy so we will refill Valtrex             Plan     1. Annual examination or sooner as needed; and only problem burning with intercourse with no evidence of any vaginal infection.  We will follow-up STD screening provide [place] silk to use during intercourse.  Could be related to the progesterone effect of the  2. ?  3. 1000 mg calcium in divided doses ideally in diet; regular exercise  4. Self breast awareness if > 30 years of age  4.   Not sure there would may be low yield from STD screening today given negative results recently but I will have those available and her partners not wearing a condom she did have trichomoniasis in the past I did not see any on the KOH today.            New Medications Ordered This Visit   Medications   • valACYclovir (VALTREX) 500 MG tablet     Sig: Take 1 tablet by mouth Daily.     Dispense:  30 tablet     Refill:  5     No orders of the defined types were placed in this encounter.          This note was electronically signed.    Salvador Oshea MD  1/23/2020

## 2020-08-05 PROCEDURE — 87661 TRICHOMONAS VAGINALIS AMPLIF: CPT | Performed by: NURSE PRACTITIONER

## 2020-08-05 PROCEDURE — 87491 CHLMYD TRACH DNA AMP PROBE: CPT | Performed by: NURSE PRACTITIONER

## 2020-08-05 PROCEDURE — 87591 N.GONORRHOEAE DNA AMP PROB: CPT | Performed by: NURSE PRACTITIONER

## 2020-08-12 ENCOUNTER — TELEPHONE (OUTPATIENT)
Dept: URGENT CARE | Facility: CLINIC | Age: 30
End: 2020-08-12

## 2020-08-12 NOTE — TELEPHONE ENCOUNTER
----- Message from CANDELARIO Holder sent at 8/11/2020  8:40 AM EDT -----  Negative results, see how patient is feeling  Letter sent to pt with results.

## 2020-11-12 PROCEDURE — U0003 INFECTIOUS AGENT DETECTION BY NUCLEIC ACID (DNA OR RNA); SEVERE ACUTE RESPIRATORY SYNDROME CORONAVIRUS 2 (SARS-COV-2) (CORONAVIRUS DISEASE [COVID-19]), AMPLIFIED PROBE TECHNIQUE, MAKING USE OF HIGH THROUGHPUT TECHNOLOGIES AS DESCRIBED BY CMS-2020-01-R: HCPCS | Performed by: FAMILY MEDICINE

## 2021-01-25 ENCOUNTER — OFFICE VISIT (OUTPATIENT)
Dept: OBSTETRICS AND GYNECOLOGY | Facility: CLINIC | Age: 31
End: 2021-01-25

## 2021-01-25 VITALS
HEIGHT: 65 IN | WEIGHT: 175 LBS | SYSTOLIC BLOOD PRESSURE: 116 MMHG | BODY MASS INDEX: 29.16 KG/M2 | DIASTOLIC BLOOD PRESSURE: 70 MMHG

## 2021-01-25 DIAGNOSIS — Z97.5 NEXPLANON IN PLACE: ICD-10-CM

## 2021-01-25 DIAGNOSIS — Z01.419 ENCOUNTER FOR WELL WOMAN EXAM WITH ROUTINE GYNECOLOGICAL EXAM: Primary | ICD-10-CM

## 2021-01-25 PROBLEM — B00.9 HSV-1 INFECTION: Status: RESOLVED | Noted: 2017-08-14 | Resolved: 2021-01-25

## 2021-01-25 PROBLEM — N91.2 AMENORRHEA: Status: ACTIVE | Noted: 2021-01-25

## 2021-01-25 PROBLEM — A59.01 TRICHOMONAL VAGINITIS: Status: RESOLVED | Noted: 2019-11-25 | Resolved: 2021-01-25

## 2021-01-25 PROBLEM — Z20.2 STD EXPOSURE: Status: RESOLVED | Noted: 2018-11-08 | Resolved: 2021-01-25

## 2021-01-25 PROCEDURE — 99395 PREV VISIT EST AGE 18-39: CPT | Performed by: OBSTETRICS & GYNECOLOGY

## 2021-01-25 NOTE — PROGRESS NOTES
Subjective   Chief Complaint   Patient presents with   • Annual Exam     Elodia Dunbar is a 31 y.o. year old  presenting to be seen for her annual exam.    Current birth control method: Nexplanon.    No LMP recorded. Patient has had an implant.    She is sexually active.   Condoms are not typically used.    STDs and sexual behavior discussed.  She occasionally spots after intercourse  [place] is painful or she is having problems :no  She has concerns about domestic violence: no.    Cycle Frequency: absent                         She exercises regularly: no. Works [employer]   Discussed maintaining healthy weight and nutrition and injury avoidance  Self breast awareness:no    Calcium intake: is not adequate.2  Caffeine intake: caffeine use is moderate-to-high daily  Social History    Tobacco Use      Smoking status: Never Smoker      Smokeless tobacco: Never Used    Social History     Substance and Sexual Activity   Alcohol Use Not Currently    Comment: beer/ quit in 2019      Discussed avoidance of illicit drugs    The following portions of the patient's history were reviewed and updated as is  appropriate:problem list, current medications, allergies, past family history, past medical history, past social history and past surgical history.    Current Outpatient Medications:   •  Etonogestrel (NEXPLANON SC), Inject  under the skin into the appropriate area as directed., Disp: , Rfl:   •  valACYclovir (VALTREX) 500 MG tablet, Take 1 tablet by mouth Daily., Disp: 30 tablet, Rfl: 5    Discussed risk factors for hypertension, hyperlipidemia coronary heart disease.    Review of systems    Constitutional    POS nothing reported                            NEG anorexia, fatigue, fevers or night sweats  Breast                POS nothing reported                            NEG persistent breast lump, skin dimpling or nipple discharge  GI                      POS nothing reported                             "NEG bloating, change in bowel habits, melena or reflux symptoms                       POS nothing reported                            NEG dysuria, frequency or hematuria         Objective   /70   Ht 165.7 cm (65.25\")   Wt 79.4 kg (175 lb)   Breastfeeding No   BMI 28.90 kg/m²       General:  well developed; well nourished  no acute distress  appears stated age   Skin:  No suspicious lesions seen   Thyroid:    Breasts:  Examined in supine position  Symmetric without masses or skin dimpling  Nipples normal without inversion, lesions or discharge  Fibrocystic changes are present both breasts without a discrete mass   Abdomen: soft, non-tender; no masses  no umbilical or inguinal hernias are present  no hepato-splenomegaly   Pelvis: Clinical staff was present for exam  External genitalia:  normal appearance of the external genitalia including Bartholin's and [place]'s glands.  :  urethral meatus normal;  Vaginal:  normal pink mucosa without prolapse or lesions.  Cervix:  normal appearance. friable; Pap obtained Slight spotting with Pap testing  Uterus:  normal size, shape and consistency.  Adnexa:  normal bimanual exam of the adnexa.  Rectal:  digital rectal exam not performed; anus visually normal appearing.       Lab Review   STD swabs for GC, chlamydia and trichimoniasis and UA    Imaging  Pelvic ultrasound report               Assessment     1. Normal GYN examination with minimal spotting associated with Pap testing and some postcoital bleeding.  Reassuring if Pap smear returns normal  2. As an addendum she reports that when she squeezes her nipple little bit of milky discharge will come out self told her to stop squeezing her nipple as it does not sound like this is spontaneous she is having no visual symptoms headaches etc. so doubtful prolactin level is elevated.             Plan     1. Annual examination or sooner as needed; condoms for STD protection; follow-up Pap cotesting and STD screening "   2. 1000 mg calcium in divided doses ideally in diet; regular exercise  3. Self breast awareness discussed; given paternal aunt had breast cancer in 40s would start screening at age 40  4.    Urged to watch the 4 P's carbohydrates and portion sizes regarding weight  5.   Nexplanon will need to be removed and replaced  2/22            No orders of the defined types were placed in this encounter.    No orders of the defined types were placed in this encounter.          This note was electronically signed.    Salvador Oshea MD  1/25/2021

## 2022-02-18 ENCOUNTER — OFFICE VISIT (OUTPATIENT)
Dept: OBSTETRICS AND GYNECOLOGY | Facility: CLINIC | Age: 32
End: 2022-02-18

## 2022-02-18 VITALS
SYSTOLIC BLOOD PRESSURE: 110 MMHG | WEIGHT: 158 LBS | RESPIRATION RATE: 16 BRPM | BODY MASS INDEX: 26.09 KG/M2 | DIASTOLIC BLOOD PRESSURE: 60 MMHG

## 2022-02-18 DIAGNOSIS — Z97.5 NEXPLANON IN PLACE: ICD-10-CM

## 2022-02-18 DIAGNOSIS — Z01.419 ENCOUNTER FOR GYNECOLOGICAL EXAMINATION WITHOUT ABNORMAL FINDING: Primary | ICD-10-CM

## 2022-02-18 DIAGNOSIS — Z20.2 POSSIBLE EXPOSURE TO STD: ICD-10-CM

## 2022-02-18 DIAGNOSIS — B00.9 HSV (HERPES SIMPLEX VIRUS) INFECTION: ICD-10-CM

## 2022-02-18 DIAGNOSIS — Z30.46 ENCOUNTER FOR SURVEILLANCE OF IMPLANTABLE SUBDERMAL CONTRACEPTIVE: ICD-10-CM

## 2022-02-18 PROCEDURE — 99395 PREV VISIT EST AGE 18-39: CPT | Performed by: NURSE PRACTITIONER

## 2022-02-18 PROCEDURE — 2014F MENTAL STATUS ASSESS: CPT | Performed by: NURSE PRACTITIONER

## 2022-02-18 PROCEDURE — 3008F BODY MASS INDEX DOCD: CPT | Performed by: NURSE PRACTITIONER

## 2022-02-18 RX ORDER — VALACYCLOVIR HYDROCHLORIDE 500 MG/1
500 TABLET, FILM COATED ORAL DAILY
Qty: 30 TABLET | Refills: 5 | OUTPATIENT
Start: 2022-02-18 | End: 2022-05-31

## 2022-02-18 NOTE — PROGRESS NOTES
Annual Visit     Patient Name: Elodia Dunbar  : 1990   MRN: [id number]   Care Team: Patient Care Team:  Provider, No Known as PCP - General  Provider, No Known as PCP - Family Medicine    Chief Complaint:    Chief Complaint   Patient presents with   • Annual Exam       HPI: Elodia Dunbar is a 32 y.o. year old  presenting to be seen for her gynecologic exam.   Pap smear  WNL and HPV negative     Nexplanon placed 2019 - this Nexplanon #2   Amenorrhea to hypomenorrhea with method   She is really pleased with it and would like replacement     Hx HSV 1 & 2 - genital   Valtrex 500mg with outbreaks only   Approx 5-6 outbreaks in the last year     Would like HIV and RPR screening today   States a couple of yrs ago she had a new partner and was very concerned about possible exposures   She has had negative testing but no screening for over a yr now       Subjective      /60   Resp 16   Wt 71.7 kg (158 lb)   Breastfeeding No   BMI 26.09 kg/m²     BMI reviewed: Body mass index is 26.09 kg/m².      Objective     Physical Exam    Neuro: alert and oriented to person, place and time   General:  alert; cooperative; well developed; well nourished   Skin:  No suspicious lesions seen   Thyroid: normal to inspection and palpation   Lungs:  breathing is unlabored  clear to auscultation bilaterally   Heart:  regular rate and rhythm, S1, S2 normal, no murmur, click, rub or gallop  normal apical impulse   Breasts:  Examined in supine position  Symmetric without masses or skin dimpling  Nipples normal without inversion, lesions or discharge  There are no palpable axillary nodes  Fibrocystic changes are present both breasts without a discrete mass   Abdomen: soft, non-tender; no masses  no umbilical or inguinal hernias are present  no hepato-splenomegaly   Pelvis: Clinical staff was present for exam  External genitalia:  normal appearance of the external genitalia including Bartholin's and [place]'s glands.  :   urethral meatus normal;  Vaginal:  normal pink mucosa without prolapse or lesions.  Cervix:  normal appearance.  Uterus:  normal size, shape and consistency.  Adnexa:  normal bimanual exam of the adnexa.  Rectal:  digital rectal exam not performed; anus visually normal appearing.         Assessment / Plan      Assessment  Problems Addressed This Visit    ICD-10-CM ICD-9-CM   1. Encounter for gynecological examination without abnormal finding  Z01.419 V72.31   2. Nexplanon in place  Z97.5 V45.52   3. Encounter for surveillance of implantable subdermal contraceptive  Z30.46 V25.43   4. Possible exposure to STD  Z20.2 V01.6       Plan    Pap smear not indicated today   Discussed monthly SBEs and fibrocystic breast changes     Reviewed expected bldg pattern with Nexplanon   Would like replacement - will schedule in the next month     HIV and RPR ordered   Discussed indications for daily Valtrex - she would like to continue with episodic only at this time   States her outbreaks are very mild   Refill valtrex to pharmacy     AV 1 yr and Nexplanon replacement in the next month             Follow Up  Return in about 5 weeks (around 3/25/2022) for Recheck - replace Nexplanon .  Patient was given instructions and counseling regarding her condition or for health maintenance advice. Please see specific information pulled into the AVS if appropriate.     Eva Toure, CANDELARIO  February 18, 2022  09:39 EST

## 2022-04-01 ENCOUNTER — OFFICE VISIT (OUTPATIENT)
Dept: OBSTETRICS AND GYNECOLOGY | Facility: CLINIC | Age: 32
End: 2022-04-01

## 2022-04-01 VITALS
RESPIRATION RATE: 16 BRPM | WEIGHT: 166 LBS | BODY MASS INDEX: 27.41 KG/M2 | SYSTOLIC BLOOD PRESSURE: 116 MMHG | DIASTOLIC BLOOD PRESSURE: 70 MMHG

## 2022-04-01 DIAGNOSIS — Z30.46 ENCOUNTER FOR REMOVAL AND REINSERTION OF NEXPLANON: Primary | ICD-10-CM

## 2022-04-01 PROCEDURE — 11983 REMOVE/INSERT DRUG IMPLANT: CPT | Performed by: NURSE PRACTITIONER

## 2022-04-01 NOTE — PROGRESS NOTES
Problem Visit     Patient Name: Elodia Dunbar  : 1990   MRN: [id number]   Care Team: Patient Care Team:  Provider, No Known as PCP - General  Provider, No Known as PCP - Family Medicine    Chief Complaint:    Chief Complaint   Patient presents with   • Contraception     Nexplanon removal with reinsertion of nexplanon       HPI: Elodia Dunbar is a 32 y.o. year old  presenting to be seen for Nexplanon replacement.   Due for replacement 2022   Amenorrhea to hypomenorrhea with method   Denies questions/concerns re: method or procedure today     AV done 2022       Subjective      /70   Resp 16   Wt 75.3 kg (166 lb)   LMP 2022   Breastfeeding No   BMI 27.41 kg/m²     BMI reviewed: Body mass index is 27.41 kg/m².      Objective     Physical Exam      Neuro: alert and oriented to person, place and time   General:  alert; cooperative; well developed; well nourished   Skin:  Not performed.   Thyroid: not examined   Lungs:  breathing is unlabored   Heart:  Not performed.   Breasts:  Not performed.   Abdomen: Not performed.   Pelvis: Not performed.     Nexplanon Removal     Patient desires Nexplanon removal. She understands fertility will resume very soon. Nexplanon palpated, an appears to have been properly inserted. Skin area prepped with Povodine Iodine. Insertion point infiltrated with a wheal of 1% Lidocaine using 31G needle.     Implant milked toward skin incision. Small Hyacinth forceps used to grasp and remove implant. Band aid used to close incision. Coban applied, and patient advised to keep dressing in place for 24 hours, and keep band aid on incision for 72 hours. Patient tolerated procedure without incident.     Nexplanon Insertion     Patient desires Nexplanon insertion. She understands contraception effect begins almost immediately. She also understands fertility will resume very soon after removal in 3 years, or when desired.     Nexplanon insertion site on inner, upper left arm  palpated, and skin area prepped with Povodine Iodine. Insertion point and tract of insertion infiltrated with 1% Lidocaine using 31G needle. Implant identified inside insertion needle. Insertion trocar inserted per protocol, and Nexplanon deployed, using withdrawal technique. Device not identified inside trocar after insertion. Patient was able to palpate implant after insertion. Band aid used to close incision. Coban applied, and patient advised to keep dressing in place for 24 hours, and keep band aid on incision for 72 hours. Patient tolerated procedure without incident.     Assessment / Plan      Assessment  Problems Addressed This Visit    ICD-10-CM ICD-9-CM   1. Encounter for removal and reinsertion of Nexplanon  Z30.46 V25.43       Plan    Nexplanon replaced   Reviewed expected bldg pattern with method   F/u 1 month           Follow Up  Return in about 1 month (around 5/1/2022) for Recheck.  Patient was given instructions and counseling regarding her condition or for health maintenance advice. Please see specific information pulled into the AVS if appropriate.     Eva Toure, CANDELARIO  April 1, 2022  13:17 EDT

## 2022-05-31 PROCEDURE — U0004 COV-19 TEST NON-CDC HGH THRU: HCPCS | Performed by: NURSE PRACTITIONER

## 2024-06-27 PROCEDURE — 87491 CHLMYD TRACH DNA AMP PROBE: CPT | Performed by: FAMILY MEDICINE

## 2024-06-27 PROCEDURE — 87591 N.GONORRHOEAE DNA AMP PROB: CPT | Performed by: FAMILY MEDICINE

## 2024-06-27 PROCEDURE — 87798 DETECT AGENT NOS DNA AMP: CPT | Performed by: FAMILY MEDICINE

## 2024-06-27 PROCEDURE — 87801 DETECT AGNT MULT DNA AMPLI: CPT | Performed by: FAMILY MEDICINE

## 2024-06-27 PROCEDURE — 87661 TRICHOMONAS VAGINALIS AMPLIF: CPT | Performed by: FAMILY MEDICINE

## 2024-06-28 ENCOUNTER — TELEPHONE (OUTPATIENT)
Dept: URGENT CARE | Facility: CLINIC | Age: 34
End: 2024-06-28

## 2024-06-28 NOTE — TELEPHONE ENCOUNTER
Patient called, regarding results, results are not back, I explained to her it usually takes 2-3 days She will be taking AZO over the counter, she stated still having discomfort, burning, odor.

## 2024-06-29 ENCOUNTER — TELEPHONE (OUTPATIENT)
Dept: URGENT CARE | Facility: CLINIC | Age: 34
End: 2024-06-29

## 2024-06-29 NOTE — TELEPHONE ENCOUNTER
Lab results are still in process, Pt was notified yesterday we will contact her once lab results are back

## 2024-07-05 ENCOUNTER — TELEPHONE (OUTPATIENT)
Dept: URGENT CARE | Facility: CLINIC | Age: 34
End: 2024-07-05

## 2024-07-05 DIAGNOSIS — N76.0 ACUTE VAGINITIS: Primary | ICD-10-CM

## 2024-07-05 RX ORDER — METRONIDAZOLE 500 MG/1
500 TABLET ORAL 2 TIMES DAILY
Qty: 14 TABLET | Refills: 0 | Status: SHIPPED | OUTPATIENT
Start: 2024-07-05 | End: 2024-07-12